# Patient Record
Sex: FEMALE | Race: ASIAN | NOT HISPANIC OR LATINO | URBAN - METROPOLITAN AREA
[De-identification: names, ages, dates, MRNs, and addresses within clinical notes are randomized per-mention and may not be internally consistent; named-entity substitution may affect disease eponyms.]

---

## 2024-05-19 ENCOUNTER — CLAIMS CREATED FROM THE CLAIM WINDOW (OUTPATIENT)
Dept: URBAN - METROPOLITAN AREA MEDICAL CENTER 16 | Facility: MEDICAL CENTER | Age: 29
End: 2024-05-19
Payer: COMMERCIAL

## 2024-05-19 DIAGNOSIS — R74.01 ABNORMAL/ELEVATED TRANSAMINASE (SGOT, AMINOTRANSFERASE): ICD-10-CM

## 2024-05-19 DIAGNOSIS — R79.89 ABNORMAL BILIRUBIN TEST: ICD-10-CM

## 2024-05-19 PROCEDURE — 99254 IP/OBS CNSLTJ NEW/EST MOD 60: CPT | Performed by: INTERNAL MEDICINE

## 2024-05-19 PROCEDURE — 99204 OFFICE O/P NEW MOD 45 MIN: CPT | Performed by: INTERNAL MEDICINE

## 2024-05-29 ENCOUNTER — DASHBOARD ENCOUNTERS (OUTPATIENT)
Age: 29
End: 2024-05-29

## 2024-05-29 ENCOUNTER — OFFICE VISIT (OUTPATIENT)
Dept: URBAN - METROPOLITAN AREA CLINIC 109 | Facility: CLINIC | Age: 29
End: 2024-05-29
Payer: COMMERCIAL

## 2024-05-29 VITALS
DIASTOLIC BLOOD PRESSURE: 83 MMHG | SYSTOLIC BLOOD PRESSURE: 125 MMHG | HEIGHT: 63 IN | TEMPERATURE: 97.7 F | BODY MASS INDEX: 24.66 KG/M2 | WEIGHT: 139.2 LBS | HEART RATE: 87 BPM

## 2024-05-29 DIAGNOSIS — R74.8 ABNORMAL LIVER ENZYMES: ICD-10-CM

## 2024-05-29 PROCEDURE — 99204 OFFICE O/P NEW MOD 45 MIN: CPT | Performed by: INTERNAL MEDICINE

## 2024-05-29 RX ORDER — LINAGLIPTIN 5 MG/1
TAKE ONE TABLET BY MOUTH EVERY MORNING TABLET, FILM COATED ORAL
Qty: 90 UNSPECIFIED | Refills: 0 | Status: ACTIVE | COMMUNITY

## 2024-05-29 RX ORDER — NITROFURANTOIN (MONOHYDRATE/MACROCRYSTALS) 25; 75 MG/1; MG/1
CAPSULE ORAL
Qty: 14 CAPSULE | Status: ACTIVE | COMMUNITY

## 2024-05-29 RX ORDER — LEVONORGESTREL AND ETHINYL ESTRADIOL 0.1-0.02MG
TAKE ONE TABLET BY MOUTH ONE TIME DAILY FOR 90 DAYS KIT ORAL
Qty: 84 UNSPECIFIED | Refills: 0 | Status: ACTIVE | COMMUNITY

## 2024-05-29 RX ORDER — METFORMIN HYDROCHLORIDE 750 MG/1
TAKE ONE TABLET BY MOUTH EVERY EVENING TABLET, EXTENDED RELEASE ORAL
Qty: 90 UNSPECIFIED | Refills: 0 | Status: ACTIVE | COMMUNITY

## 2024-05-29 RX ORDER — ACYCLOVIR 400 MG/1
TAKE ONE TABLET BY MOUTH TWICE A DAY EVERY MORNING AND BEFORE BEDTIME TABLET ORAL
Qty: 180 UNSPECIFIED | Refills: 1 | Status: ACTIVE | COMMUNITY

## 2024-06-24 ENCOUNTER — OFFICE VISIT (OUTPATIENT)
Dept: URBAN - METROPOLITAN AREA CLINIC 118 | Facility: CLINIC | Age: 29
End: 2024-06-24
Payer: COMMERCIAL

## 2024-06-24 VITALS
WEIGHT: 139 LBS | HEART RATE: 61 BPM | DIASTOLIC BLOOD PRESSURE: 83 MMHG | SYSTOLIC BLOOD PRESSURE: 116 MMHG | TEMPERATURE: 97.2 F | BODY MASS INDEX: 26.24 KG/M2 | HEIGHT: 61 IN

## 2024-06-24 DIAGNOSIS — R74.8 ABNORMAL LIVER ENZYMES: ICD-10-CM

## 2024-06-24 PROCEDURE — 99213 OFFICE O/P EST LOW 20 MIN: CPT | Performed by: INTERNAL MEDICINE

## 2024-06-24 RX ORDER — NITROFURANTOIN (MONOHYDRATE/MACROCRYSTALS) 25; 75 MG/1; MG/1
CAPSULE ORAL
Qty: 14 CAPSULE | Status: ACTIVE | COMMUNITY

## 2024-06-24 RX ORDER — LEVONORGESTREL AND ETHINYL ESTRADIOL 0.1-0.02MG
TAKE ONE TABLET BY MOUTH ONE TIME DAILY FOR 90 DAYS KIT ORAL
Qty: 84 UNSPECIFIED | Refills: 0 | Status: ACTIVE | COMMUNITY

## 2024-06-24 RX ORDER — ACYCLOVIR 400 MG/1
TAKE ONE TABLET BY MOUTH TWICE A DAY EVERY MORNING AND BEFORE BEDTIME TABLET ORAL
Qty: 180 UNSPECIFIED | Refills: 1 | Status: ACTIVE | COMMUNITY

## 2024-06-24 RX ORDER — LINAGLIPTIN 5 MG/1
TAKE ONE TABLET BY MOUTH EVERY MORNING TABLET, FILM COATED ORAL
Qty: 90 UNSPECIFIED | Refills: 0 | Status: ACTIVE | COMMUNITY

## 2024-06-24 RX ORDER — METFORMIN HYDROCHLORIDE 750 MG/1
TAKE ONE TABLET BY MOUTH EVERY EVENING TABLET, EXTENDED RELEASE ORAL
Qty: 90 UNSPECIFIED | Refills: 0 | Status: ACTIVE | COMMUNITY

## 2024-06-24 NOTE — HPI-TODAY'S VISIT:
The patient presents for f/u appt.  doing well.  no new gi complaints/symptoms.  denies abd pain, recurrent jaundice, etc.

## 2024-06-25 LAB
ALBUMIN/GLOBULIN RATIO: 1.5
ALBUMIN: 5
ALKALINE PHOSPHATASE: 47
ALT (SGPT): 26
AST (SGOT): 20
BILIRUBIN, DIRECT: 0.3
BILIRUBIN, INDIRECT: 0.8
BILIRUBIN, TOTAL: 1.1
GLOBULIN: 3.4
PROTEIN, TOTAL: 8.4